# Patient Record
Sex: MALE | Race: WHITE | NOT HISPANIC OR LATINO | Employment: OTHER | URBAN - METROPOLITAN AREA
[De-identification: names, ages, dates, MRNs, and addresses within clinical notes are randomized per-mention and may not be internally consistent; named-entity substitution may affect disease eponyms.]

---

## 2018-02-05 ENCOUNTER — CONSULT (OUTPATIENT)
Dept: CARDIOLOGY | Facility: CLINIC | Age: 68
End: 2018-02-05

## 2018-02-05 VITALS
HEART RATE: 74 BPM | WEIGHT: 254.8 LBS | SYSTOLIC BLOOD PRESSURE: 126 MMHG | DIASTOLIC BLOOD PRESSURE: 86 MMHG | BODY MASS INDEX: 34.51 KG/M2 | OXYGEN SATURATION: 94 % | HEIGHT: 72 IN

## 2018-02-05 DIAGNOSIS — I10 ESSENTIAL HYPERTENSION: ICD-10-CM

## 2018-02-05 DIAGNOSIS — I34.1 MITRAL VALVE PROLAPSE: Primary | ICD-10-CM

## 2018-02-05 PROCEDURE — 93000 ELECTROCARDIOGRAM COMPLETE: CPT | Performed by: INTERNAL MEDICINE

## 2018-02-05 PROCEDURE — 99203 OFFICE O/P NEW LOW 30 MIN: CPT | Performed by: INTERNAL MEDICINE

## 2018-02-05 RX ORDER — AMLODIPINE BESYLATE 5 MG/1
5 TABLET ORAL DAILY
COMMUNITY
Start: 2018-01-30 | End: 2021-03-29

## 2018-02-05 RX ORDER — METOPROLOL SUCCINATE 50 MG/1
50 TABLET, EXTENDED RELEASE ORAL DAILY
COMMUNITY
Start: 2018-01-30

## 2018-02-05 RX ORDER — LEVOTHYROXINE SODIUM 88 UG/1
100 TABLET ORAL DAILY
COMMUNITY
Start: 2017-12-06 | End: 2022-04-04

## 2018-02-05 NOTE — PROGRESS NOTES
Subjective:     Encounter Date:02/05/2018    Primary Care Physician: Ras Blanco MD      Patient ID: Kartik Chavez is a 68 y.o. male.    Chief Complaint:Establish Care; Mitral Valve Prolapse; and Heart Murmur    PROBLEM LIST:  1. Mitral valve prolapse  a. January 2017 echocardiogram done in [place] with mild mitral valve prolapse.  Mild mitral regurgitation.  EF of 56%.  Left atrium mildly dilated.  2. Hypertension  3. Arthritis  4. Hypothyroidism  5. Surgeries:  a. Umbilical hernia repair.  b. Left knee surgery      Allergies   Allergen Reactions   • Penicillins      Cold sweat         Current Outpatient Prescriptions:   •  amLODIPine (NORVASC) 5 MG tablet, Daily., Disp: , Rfl:   •  levothyroxine (SYNTHROID, LEVOTHROID) 75 MCG tablet, Daily., Disp: , Rfl:   •  metoprolol succinate XL (TOPROL-XL) 50 MG 24 hr tablet, Daily., Disp: , Rfl:         History of Present Illness    Patient is a 68 year old  male who we are seeing today for establishment of cardiac care secondary to mitral valve prolapse.  Has no history of coronary disease.  Overall feels well.  His primary cardiologist recently left and he needs to establish care.  Denies any chest pain, pressure, tightness.  Denies any increasing shortness of breath.  No syncope, near-syncope, or edema.  States he is compliant with his medications.    The following portions of the patient's history were reviewed and updated as appropriate: allergies, current medications, past family history, past medical history, past social history, past surgical history and problem list.    Family History   Problem Relation Age of Onset   • Aneurysm Mother      heart   • Heart attack Father    • Heart attack Sister    • Rheumatic fever Brother    • Heart disease Brother    • No Known Problems Sister        Social History   Substance Use Topics   • Smoking status: Never Smoker   • Smokeless tobacco: Never Used   • Alcohol use No         Review of Systems   Constitution:  "Negative for fever, weakness and malaise/fatigue.   HENT: Negative for nosebleeds.    Eyes: Negative for redness and visual disturbance.   Cardiovascular: Negative for orthopnea, palpitations and paroxysmal nocturnal dyspnea.   Respiratory: Positive for shortness of breath, snoring and wheezing. Negative for cough and sputum production.    Hematologic/Lymphatic: Negative for bleeding problem.   Skin: Negative for flushing, itching and rash.   Musculoskeletal: Positive for arthritis. Negative for falls, joint pain and muscle cramps.   Gastrointestinal: Negative for abdominal pain, diarrhea, heartburn, nausea and vomiting.   Genitourinary: Positive for urgency. Negative for hematuria.   Neurological: Negative for excessive daytime sleepiness, dizziness, headaches and tremors.   Psychiatric/Behavioral: Negative for substance abuse. The patient is not nervous/anxious.           Objective:    height is 182.9 cm (72\") and weight is 116 kg (254 lb 12.8 oz). His blood pressure is 126/86 and his pulse is 74. His oxygen saturation is 94%.         Physical Exam   Constitutional: He is oriented to person, place, and time. He appears well-developed and well-nourished.   HENT:   Head: Normocephalic and atraumatic.   Mouth/Throat: Oropharynx is clear and moist.   Eyes: Conjunctivae are normal. Pupils are equal, round, and reactive to light.   Neck: Normal carotid pulses and no JVD present. Carotid bruit is not present. No thyromegaly present.   Cardiovascular: Normal rate, regular rhythm, S1 normal and S2 normal.  Exam reveals no gallop and no friction rub.    Murmur heard.   Crescendo-decrescendo mid to late systolic murmur is present with a grade of 3/6  at the apex  Pulses:       Carotid pulses are 2+ on the right side, and 2+ on the left side.       Dorsalis pedis pulses are 2+ on the right side, and 2+ on the left side.        Posterior tibial pulses are 2+ on the right side, and 2+ on the left side.   Pulmonary/Chest: No " respiratory distress. He has no wheezes. He has no rales. He exhibits no tenderness.   Abdominal: He exhibits no distension, no abdominal bruit and no mass. There is no hepatosplenomegaly. There is no tenderness. There is no rebound.   Musculoskeletal: He exhibits no edema, tenderness or deformity.   Lymphadenopathy:     He has no cervical adenopathy.   Neurological: He is alert and oriented to person, place, and time. He has normal strength.   Skin: Skin is warm and dry. No rash noted. No cyanosis. Nails show no clubbing.   Psychiatric: He has a normal mood and affect. Cognition and memory are normal.         ECG 12 Lead  Date/Time: 2/5/2018 4:43 PM  Performed by: BK COE  Authorized by: BK COE   Rhythm: sinus rhythm  Ectopy: PVCs                  Assessment:   Assessment/Plan      Kartik was seen today for establish care, mitral valve prolapse and heart murmur.    Diagnoses and all orders for this visit:    Mitral valve prolapse    Essential hypertension    Other orders  -     ECG 12 Lead      1.  Mitral valve prolapse, mild to moderate by examination and echocardiography.  Asymptomatic.  We'll simply follow along for now.  Discussed symptoms with which to seek medical attention for.  Otherwise we'll follow clinically with no need for surgical repair at this time.  2.  Hypertension controlled  CDL paperwork filled out.  3.  Revisit annually or when necessary symptom change       Corinne ARMSTRONG scribed portions of this dictation for  Dr. Coe.  I, Bk Coe MD, personally performed the services described in this documentation as scribed by the above individual in my presence, and it is both accurate and complete    Dictated utilizing Dragon dictation

## 2019-02-11 ENCOUNTER — OFFICE VISIT (OUTPATIENT)
Dept: CARDIOLOGY | Facility: CLINIC | Age: 69
End: 2019-02-11

## 2019-02-11 VITALS
DIASTOLIC BLOOD PRESSURE: 84 MMHG | WEIGHT: 239.6 LBS | SYSTOLIC BLOOD PRESSURE: 132 MMHG | HEART RATE: 58 BPM | OXYGEN SATURATION: 98 % | HEIGHT: 72 IN | BODY MASS INDEX: 32.45 KG/M2

## 2019-02-11 DIAGNOSIS — I34.1 MITRAL VALVE PROLAPSE: Primary | ICD-10-CM

## 2019-02-11 DIAGNOSIS — I10 ESSENTIAL HYPERTENSION: ICD-10-CM

## 2019-02-11 PROCEDURE — 99213 OFFICE O/P EST LOW 20 MIN: CPT | Performed by: INTERNAL MEDICINE

## 2019-02-11 RX ORDER — ASPIRIN 325 MG
325 TABLET ORAL DAILY
COMMUNITY

## 2019-02-11 NOTE — PROGRESS NOTES
Subjective:     Encounter Date:02/11/2019      Patient ID: Kartik Chavez is a 69 y.o. male.    Chief Complaint: Mitral Valve Prolapse and Shortness of Breath      PROBLEM LIST:  1. Mitral valve prolapse  a. January 2017 echocardiogram done in [place] with mild mitral valve prolapse.  Mild mitral regurgitation.  EF of 56%.  Left atrium mildly dilated.  2. Hypertension  3. Arthritis  4. Hypothyroidism  5. Surgeries:  a. Umbilical hernia repair.  b. Left knee surgery    History of Present Illness  Kartik Chavez returns today for a 12 month follow up with a history of mitral valve prolapse and hypertension. Since last visit he has been doing well from a cardiovascular standpoint. Describes that he has been experiencing shortness of breath Denies any exertional chest pain, shortness of breath, orthopnea, PND, or palpitations.    Allergies   Allergen Reactions   • Penicillins      Cold sweat         Current Outpatient Medications:   •  amLODIPine (NORVASC) 5 MG tablet, Take 5 mg by mouth Daily., Disp: , Rfl:   •  aspirin 325 MG tablet, Take 325 mg by mouth Daily., Disp: , Rfl:   •  levothyroxine (SYNTHROID, LEVOTHROID) 75 MCG tablet, Take 75 mcg by mouth Daily., Disp: , Rfl:   •  metoprolol succinate XL (TOPROL-XL) 50 MG 24 hr tablet, Take 50 mg by mouth Daily., Disp: , Rfl:     The following portions of the patient's history were reviewed and updated as appropriate: allergies, current medications, past family history, past medical history, past social history, past surgical history and problem list.    Review of Systems   Constitution: Positive for weight gain.   Cardiovascular: Negative.  Negative for chest pain, leg swelling, near-syncope, orthopnea, palpitations, paroxysmal nocturnal dyspnea and syncope.   Respiratory: Positive for shortness of breath. Negative for cough.    Hematologic/Lymphatic: Negative for bleeding problem. Does not bruise/bleed easily.   Skin: Negative for rash.   Musculoskeletal: Negative for  "muscle weakness and myalgias.   Gastrointestinal: Negative for heartburn, nausea and vomiting.   Neurological: Negative.  Negative for dizziness, headaches, light-headedness, loss of balance, tremors and vertigo.   Psychiatric/Behavioral: The patient is not nervous/anxious.           Objective:     Vitals:    02/11/19 1259   BP: 132/84   BP Location: Right arm   Patient Position: Sitting   Pulse: 58   SpO2: 98%   Weight: 109 kg (239 lb 9.6 oz)   Height: 182.9 cm (72\")         Physical Exam   Constitutional: He is oriented to person, place, and time. He appears well-developed and well-nourished.   HENT:   Mouth/Throat: Oropharynx is clear and moist.   Neck: No JVD present. Carotid bruit is not present. No thyromegaly present.   Cardiovascular: Regular rhythm, S1 normal, S2 normal and intact distal pulses. Exam reveals no gallop, no S3 and no S4.   Murmur heard.   Crescendo-decrescendo mid to late systolic murmur is present with a grade of 3/6 at the lower left sternal border and apex.  Pulses:       Carotid pulses are 2+ on the right side, and 2+ on the left side.       Radial pulses are 2+ on the right side, and 2+ on the left side.   Pulmonary/Chest: Breath sounds normal.   Abdominal: Soft. Bowel sounds are normal. He exhibits no mass. There is no tenderness.   Musculoskeletal: He exhibits no edema.   Neurological: He is alert and oriented to person, place, and time.   Skin: Skin is warm and dry. No rash noted.       Lab Review:    Procedures        Assessment:   Kartik was seen today for mitral valve prolapse and shortness of breath.    Diagnoses and all orders for this visit:    Mitral valve prolapse    Essential hypertension        Impression:  1. Mitral valve prolapse, moderate by exam and last echocardiogram  2. Dyspnea on exertion, possibly due to the above versus \"black lung\" which is being evaluated for    Plan:  1. Order echocardiogram.  2. Continue current medications.  3. Revisit in 12 MO, or sooner as " needed.    Scribed for Anthony Garcia MD by Joselito Olivia. 2/11/2019  1:28 PM    I, Anthony Garcia MD, personally performed the services described in this documentation as scribed by the above individual in my presence, and it is both accurate and complete      Please note that portions of this note may have been completed with a voice recognition program. Efforts were made to edit the dictations, but occasionally words are mistranscribed.

## 2019-06-17 ENCOUNTER — HOSPITAL ENCOUNTER (OUTPATIENT)
Dept: CARDIOLOGY | Facility: HOSPITAL | Age: 69
Discharge: HOME | End: 2019-06-17
Admitting: INTERNAL MEDICINE

## 2019-06-17 VITALS — WEIGHT: 239 LBS | HEIGHT: 72 IN | BODY MASS INDEX: 32.37 KG/M2

## 2019-06-17 DIAGNOSIS — I34.1 MITRAL VALVE PROLAPSE: ICD-10-CM

## 2019-06-17 LAB
BH CV ECHO MEAS - AO MAX PG (FULL): 0.92 MMHG
BH CV ECHO MEAS - AO MAX PG: 4.7 MMHG
BH CV ECHO MEAS - AO ROOT AREA (BSA CORRECTED): 1.8
BH CV ECHO MEAS - AO ROOT AREA: 14 CM^2
BH CV ECHO MEAS - AO ROOT DIAM: 4.2 CM
BH CV ECHO MEAS - AO V2 MAX: 108.6 CM/SEC
BH CV ECHO MEAS - AVA(V,A): 4.5 CM^2
BH CV ECHO MEAS - AVA(V,D): 4.5 CM^2
BH CV ECHO MEAS - BSA(HAYCOCK): 2.4 M^2
BH CV ECHO MEAS - BSA: 2.3 M^2
BH CV ECHO MEAS - BZI_BMI: 32.4 KILOGRAMS/M^2
BH CV ECHO MEAS - BZI_METRIC_HEIGHT: 182.9 CM
BH CV ECHO MEAS - BZI_METRIC_WEIGHT: 108.4 KG
BH CV ECHO MEAS - EDV(CUBED): 171.5 ML
BH CV ECHO MEAS - EDV(MOD-SP2): 110 ML
BH CV ECHO MEAS - EDV(MOD-SP4): 166 ML
BH CV ECHO MEAS - EDV(TEICH): 150.9 ML
BH CV ECHO MEAS - EF(CUBED): 69 %
BH CV ECHO MEAS - EF(MOD-SP2): 68.2 %
BH CV ECHO MEAS - EF(MOD-SP4): 65.1 %
BH CV ECHO MEAS - EF(TEICH): 59.9 %
BH CV ECHO MEAS - ESV(CUBED): 53.2 ML
BH CV ECHO MEAS - ESV(MOD-SP2): 35 ML
BH CV ECHO MEAS - ESV(MOD-SP4): 58 ML
BH CV ECHO MEAS - ESV(TEICH): 60.5 ML
BH CV ECHO MEAS - FS: 32.3 %
BH CV ECHO MEAS - IVS/LVPW: 1
BH CV ECHO MEAS - IVSD: 1 CM
BH CV ECHO MEAS - LA DIMENSION: 4.2 CM
BH CV ECHO MEAS - LA/AO: 0.98
BH CV ECHO MEAS - LAD MAJOR: 5.7 CM
BH CV ECHO MEAS - LAT PEAK E' VEL: 8.3 CM/SEC
BH CV ECHO MEAS - LATERAL E/E' RATIO: 9
BH CV ECHO MEAS - LV DIASTOLIC VOL/BSA (35-75): 72.2 ML/M^2
BH CV ECHO MEAS - LV MASS(C)D: 216 GRAMS
BH CV ECHO MEAS - LV MASS(C)DI: 94 GRAMS/M^2
BH CV ECHO MEAS - LV MAX PG: 3.8 MMHG
BH CV ECHO MEAS - LV SYSTOLIC VOL/BSA (12-30): 25.2 ML/M^2
BH CV ECHO MEAS - LV V1 MAX: 97.4 CM/SEC
BH CV ECHO MEAS - LVIDD: 5.6 CM
BH CV ECHO MEAS - LVIDS: 3.8 CM
BH CV ECHO MEAS - LVLD AP2: 7.1 CM
BH CV ECHO MEAS - LVLD AP4: 9 CM
BH CV ECHO MEAS - LVLS AP2: 6 CM
BH CV ECHO MEAS - LVLS AP4: 8.1 CM
BH CV ECHO MEAS - LVOT AREA (M): 4.9 CM^2
BH CV ECHO MEAS - LVOT AREA: 5.1 CM^2
BH CV ECHO MEAS - LVOT DIAM: 2.5 CM
BH CV ECHO MEAS - LVPWD: 1 CM
BH CV ECHO MEAS - MED PEAK E' VEL: 4.5 CM/SEC
BH CV ECHO MEAS - MEDIAL E/E' RATIO: 16.6
BH CV ECHO MEAS - MV A MAX VEL: 69.4 CM/SEC
BH CV ECHO MEAS - MV DEC TIME: 0.23 SEC
BH CV ECHO MEAS - MV E MAX VEL: 76 CM/SEC
BH CV ECHO MEAS - MV E/A: 1.1
BH CV ECHO MEAS - PA ACC SLOPE: 922.3 CM/SEC^2
BH CV ECHO MEAS - PA ACC TIME: 0.05 SEC
BH CV ECHO MEAS - PA PR(ACCEL): 56.4 MMHG
BH CV ECHO MEAS - SI(CUBED): 51.5 ML/M^2
BH CV ECHO MEAS - SI(MOD-SP2): 32.6 ML/M^2
BH CV ECHO MEAS - SI(MOD-SP4): 47 ML/M^2
BH CV ECHO MEAS - SI(TEICH): 39.3 ML/M^2
BH CV ECHO MEAS - SV(CUBED): 118.3 ML
BH CV ECHO MEAS - SV(MOD-SP2): 75 ML
BH CV ECHO MEAS - SV(MOD-SP4): 108 ML
BH CV ECHO MEAS - SV(TEICH): 90.4 ML
BH CV ECHO MEAS - TAPSE (>1.6): 2.1 CM2
BH CV ECHO MEASUREMENTS AVERAGE E/E' RATIO: 11.88
BH CV VAS BP LEFT ARM: NORMAL MMHG
BH CV XLRA - RV BASE: 4 CM
BH CV XLRA - RV LENGTH: 8.4 CM
BH CV XLRA - RV MID: 4.1 CM
BH CV XLRA - TDI S': 10.2 CM/SEC
LEFT ATRIUM VOLUME INDEX: 36.6 ML/M^2
LEFT ATRIUM VOLUME: 84 ML
LV EF 2D ECHO EST: 65 %

## 2019-06-17 PROCEDURE — 93306 TTE W/DOPPLER COMPLETE: CPT | Performed by: INTERNAL MEDICINE

## 2019-06-17 PROCEDURE — 93306 TTE W/DOPPLER COMPLETE: CPT

## 2020-03-09 ENCOUNTER — OFFICE VISIT (OUTPATIENT)
Dept: CARDIOLOGY | Facility: CLINIC | Age: 70
End: 2020-03-09

## 2020-03-09 VITALS
DIASTOLIC BLOOD PRESSURE: 82 MMHG | OXYGEN SATURATION: 93 % | HEIGHT: 72 IN | SYSTOLIC BLOOD PRESSURE: 146 MMHG | BODY MASS INDEX: 32.67 KG/M2 | HEART RATE: 65 BPM | WEIGHT: 241.2 LBS

## 2020-03-09 DIAGNOSIS — I34.1 MITRAL VALVE PROLAPSE: Primary | ICD-10-CM

## 2020-03-09 DIAGNOSIS — I10 ESSENTIAL HYPERTENSION: ICD-10-CM

## 2020-03-09 PROCEDURE — 99213 OFFICE O/P EST LOW 20 MIN: CPT | Performed by: INTERNAL MEDICINE

## 2020-03-09 RX ORDER — TELMISARTAN 20 MG/1
20 TABLET ORAL DAILY
COMMUNITY
Start: 2020-02-28

## 2020-03-09 NOTE — PROGRESS NOTES
[facility]  Subjective:     Encounter Date:03/09/2020      Patient ID: Kartik Chavez is a 70 y.o. male.    Chief Complaint: Mitral Valve Prolapse      PROBLEM LIST:  1. Mitral valve prolapse  a. Echocardiogram, January 2017, in [place]: Mild MVP, mild MR. EF 56%. Left atrium mildly dilated.  b. Echocardiogram, 06/17/2019: Moderate bileaflet MVP with myxomatous changes. Posterior mitral valve leaflet thickening. Moderate MR.  2. Hypertension  3. Arthritis  4. Hypothyroidism  5. Surgeries:  a. Umbilical hernia repair.  b. Left knee surgery    History of Present Illness  Kartik Chavez returns today for annual follow up with a history of mitral valve prolapse and hypertension. Since last visit, he has been feeling well overall from a cardiovascular standpoint. He is retired, and spends most of his time at the house. He does try to stay active with doing renovations and household chores. Patient denies chest pain, shortness of breath, edema, dizziness, and syncope.       Allergies   Allergen Reactions   • Penicillins Other (See Comments)     Cold sweat         Current Outpatient Medications:   •  amLODIPine (NORVASC) 5 MG tablet, Take 5 mg by mouth Daily., Disp: , Rfl:   •  aspirin 325 MG tablet, Take 325 mg by mouth Daily., Disp: , Rfl:   •  levothyroxine (SYNTHROID, LEVOTHROID) 88 MCG tablet, Take 88 mcg by mouth Daily., Disp: , Rfl:   •  metoprolol succinate XL (TOPROL-XL) 50 MG 24 hr tablet, Take 50 mg by mouth Daily., Disp: , Rfl:   •  telmisartan (MICARDIS) 20 MG tablet, 20 mg Daily., Disp: , Rfl:     The following portions of the patient's history were reviewed and updated as appropriate: allergies, current medications, past family history, past medical history, past social history, past surgical history and problem list.    Review of Systems   Constitution: Negative. Negative for malaise/fatigue.   Eyes: Negative for vision loss in left eye and vision loss in right eye.  "  Cardiovascular: Positive for irregular heartbeat. Negative for chest pain, dyspnea on exertion, near-syncope, orthopnea, palpitations, paroxysmal nocturnal dyspnea and syncope.   Respiratory: Negative.    Hematologic/Lymphatic: Bruises/bleeds easily.   Skin: Negative for rash.   Musculoskeletal: Positive for arthritis and back pain. Negative for muscle weakness and myalgias.   Gastrointestinal: Negative for heartburn, nausea and vomiting.   Genitourinary: Positive for frequency.   Neurological: Negative.  Negative for brief paralysis, excessive daytime sleepiness, focal weakness, numbness, paresthesias and weakness.          Objective:     Vitals:    03/09/20 1433   BP: 146/82   BP Location: Right arm   Patient Position: Sitting   Pulse: 65   SpO2: 93%   Weight: 109 kg (241 lb 3.2 oz)   Height: 182.9 cm (72\")         Physical Exam   Constitutional: He is oriented to person, place, and time. He appears well-developed and well-nourished.   HENT:   Mouth/Throat: Oropharynx is clear and moist.   Neck: No JVD present. Carotid bruit is not present. No thyromegaly present.   Cardiovascular: Regular rhythm, S1 normal, S2 normal and intact distal pulses. Exam reveals no gallop, no S3 and no S4.   Murmur heard.   Mid to late systolic murmur is present with a grade of 3/6.  Pulses:       Carotid pulses are 2+ on the right side, and 2+ on the left side.       Radial pulses are 2+ on the right side, and 2+ on the left side.   Pulmonary/Chest: Breath sounds normal.   Abdominal: Soft. Bowel sounds are normal. He exhibits no mass. There is no tenderness.   Musculoskeletal: He exhibits no edema.   Neurological: He is alert and oriented to person, place, and time.   Skin: Skin is warm and dry. No rash noted.           Assessment:   Kartik was seen today for mitral valve prolapse.    Diagnoses and all orders for this visit:    Mitral valve prolapse    Essential hypertension        Impression:  1. Mitral valve prolapse: Moderate " bileaflet MVP with myxomatous changes on June 2019 echo. Stable. Aspirin 325 mg daily.  2. Hypertension, acceptable control on amlodipine, metoprolol, and telmisartan.     Plan:  1. Schedule echocardiogram to reassess VHD. Pt instructed to monitor himself for any unusual dyspnea, fatigue, swelling, or dizziness. If he begins to experience any of these symptoms, he should call our office and schedule a f/up appointment.  2. Continue current medications.  3. Revisit in 12 MO, or sooner as needed.    Scribed for Anthony Garcia MD by Jacki Hughes. 3/9/2020  4:25 PM    Anthony Garcia MD      Please note that portions of this note may have been completed with a voice recognition program. Efforts were made to edit the dictations, but occasionally words are mistranscribed.

## 2020-07-13 ENCOUNTER — HOSPITAL ENCOUNTER (OUTPATIENT)
Dept: CARDIOLOGY | Facility: HOSPITAL | Age: 70
Discharge: HOME | End: 2020-07-13
Admitting: INTERNAL MEDICINE

## 2020-07-13 VITALS
HEIGHT: 72 IN | BODY MASS INDEX: 32.64 KG/M2 | WEIGHT: 241 LBS | SYSTOLIC BLOOD PRESSURE: 124 MMHG | DIASTOLIC BLOOD PRESSURE: 93 MMHG

## 2020-07-13 DIAGNOSIS — I34.1 MITRAL VALVE PROLAPSE: ICD-10-CM

## 2020-07-13 PROCEDURE — 93306 TTE W/DOPPLER COMPLETE: CPT

## 2020-07-13 PROCEDURE — 93306 TTE W/DOPPLER COMPLETE: CPT | Performed by: INTERNAL MEDICINE

## 2020-07-14 LAB
BH CV ECHO MEAS - AI DEC SLOPE: 322.1 CM/SEC^2
BH CV ECHO MEAS - AI MAX PG: 102.4 MMHG
BH CV ECHO MEAS - AI MAX VEL: 505.9 CM/SEC
BH CV ECHO MEAS - AI P1/2T: 460.1 MSEC
BH CV ECHO MEAS - AO MAX PG (FULL): 3.3 MMHG
BH CV ECHO MEAS - AO MAX PG: 8 MMHG
BH CV ECHO MEAS - AO MEAN PG (FULL): 1.8 MMHG
BH CV ECHO MEAS - AO MEAN PG: 3.7 MMHG
BH CV ECHO MEAS - AO ROOT AREA (BSA CORRECTED): 1.7
BH CV ECHO MEAS - AO ROOT AREA: 12.7 CM^2
BH CV ECHO MEAS - AO ROOT DIAM: 4 CM
BH CV ECHO MEAS - AO V2 MAX: 137.9 CM/SEC
BH CV ECHO MEAS - AO V2 MEAN: 87.8 CM/SEC
BH CV ECHO MEAS - AO V2 VTI: 29.5 CM
BH CV ECHO MEAS - ASC AORTA: 3.7 CM
BH CV ECHO MEAS - AVA(I,A): 2.8 CM^2
BH CV ECHO MEAS - AVA(I,D): 2.8 CM^2
BH CV ECHO MEAS - AVA(V,A): 3.1 CM^2
BH CV ECHO MEAS - AVA(V,D): 3.1 CM^2
BH CV ECHO MEAS - BSA(HAYCOCK): 2.4 M^2
BH CV ECHO MEAS - BSA: 2.3 M^2
BH CV ECHO MEAS - BZI_BMI: 32.7 KILOGRAMS/M^2
BH CV ECHO MEAS - BZI_METRIC_HEIGHT: 182.9 CM
BH CV ECHO MEAS - BZI_METRIC_WEIGHT: 109.3 KG
BH CV ECHO MEAS - EDV(CUBED): 277.9 ML
BH CV ECHO MEAS - EDV(MOD-SP2): 133 ML
BH CV ECHO MEAS - EDV(MOD-SP4): 163 ML
BH CV ECHO MEAS - EDV(TEICH): 217.9 ML
BH CV ECHO MEAS - EF(CUBED): 85.4 %
BH CV ECHO MEAS - EF(MOD-BP): 54.7 %
BH CV ECHO MEAS - EF(MOD-SP2): 62.3 %
BH CV ECHO MEAS - EF(MOD-SP4): 54.8 %
BH CV ECHO MEAS - EF(TEICH): 77.7 %
BH CV ECHO MEAS - ESV(CUBED): 40.5 ML
BH CV ECHO MEAS - ESV(MOD-SP2): 50.2 ML
BH CV ECHO MEAS - ESV(MOD-SP4): 73.7 ML
BH CV ECHO MEAS - ESV(TEICH): 48.6 ML
BH CV ECHO MEAS - FS: 47.4 %
BH CV ECHO MEAS - IVS/LVPW: 0.93
BH CV ECHO MEAS - IVSD: 0.96 CM
BH CV ECHO MEAS - LA DIMENSION: 4.9 CM
BH CV ECHO MEAS - LA/AO: 1.2
BH CV ECHO MEAS - LAD MAJOR: 6.6 CM
BH CV ECHO MEAS - LAT PEAK E' VEL: 11.7 CM/SEC
BH CV ECHO MEAS - LATERAL E/E' RATIO: 7.3
BH CV ECHO MEAS - LV DIASTOLIC VOL/BSA (35-75): 70.7 ML/M^2
BH CV ECHO MEAS - LV MASS(C)D: 283.7 GRAMS
BH CV ECHO MEAS - LV MASS(C)DI: 123 GRAMS/M^2
BH CV ECHO MEAS - LV MAX PG: 4.7 MMHG
BH CV ECHO MEAS - LV MEAN PG: 1.9 MMHG
BH CV ECHO MEAS - LV SYSTOLIC VOL/BSA (12-30): 32 ML/M^2
BH CV ECHO MEAS - LV V1 MAX: 108 CM/SEC
BH CV ECHO MEAS - LV V1 MEAN: 63.2 CM/SEC
BH CV ECHO MEAS - LV V1 VTI: 20.9 CM
BH CV ECHO MEAS - LVIDD: 6.5 CM
BH CV ECHO MEAS - LVIDS: 3.4 CM
BH CV ECHO MEAS - LVLD AP2: 8.6 CM
BH CV ECHO MEAS - LVLD AP4: 10 CM
BH CV ECHO MEAS - LVLS AP2: 6.3 CM
BH CV ECHO MEAS - LVLS AP4: 8.6 CM
BH CV ECHO MEAS - LVOT AREA (M): 3.8 CM^2
BH CV ECHO MEAS - LVOT AREA: 4 CM^2
BH CV ECHO MEAS - LVOT DIAM: 2.2 CM
BH CV ECHO MEAS - LVPWD: 1 CM
BH CV ECHO MEAS - MED PEAK E' VEL: 6.5 CM/SEC
BH CV ECHO MEAS - MEDIAL E/E' RATIO: 13.2
BH CV ECHO MEAS - MR MAX PG: 90 MMHG
BH CV ECHO MEAS - MR MAX VEL: 474.2 CM/SEC
BH CV ECHO MEAS - MR MEAN PG: 50.5 MMHG
BH CV ECHO MEAS - MR MEAN VEL: 334.5 CM/SEC
BH CV ECHO MEAS - MR VTI: 154.2 CM
BH CV ECHO MEAS - MV A MAX VEL: 81 CM/SEC
BH CV ECHO MEAS - MV DEC SLOPE: 382.9 CM/SEC^2
BH CV ECHO MEAS - MV DEC TIME: 0.2 SEC
BH CV ECHO MEAS - MV E MAX VEL: 85.3 CM/SEC
BH CV ECHO MEAS - MV E/A: 1.1
BH CV ECHO MEAS - MV MAX PG: 3.5 MMHG
BH CV ECHO MEAS - MV MEAN PG: 1.3 MMHG
BH CV ECHO MEAS - MV P1/2T MAX VEL: 88.1 CM/SEC
BH CV ECHO MEAS - MV P1/2T: 67.4 MSEC
BH CV ECHO MEAS - MV V2 MAX: 93.5 CM/SEC
BH CV ECHO MEAS - MV V2 MEAN: 52 CM/SEC
BH CV ECHO MEAS - MV V2 VTI: 24.7 CM
BH CV ECHO MEAS - MVA P1/2T LCG: 2.5 CM^2
BH CV ECHO MEAS - MVA(P1/2T): 3.3 CM^2
BH CV ECHO MEAS - MVA(VTI): 3.3 CM^2
BH CV ECHO MEAS - PA ACC TIME: 0.07 SEC
BH CV ECHO MEAS - PA MAX PG: 5.2 MMHG
BH CV ECHO MEAS - PA PR(ACCEL): 45.7 MMHG
BH CV ECHO MEAS - PA V2 MAX: 114.4 CM/SEC
BH CV ECHO MEAS - RAP SYSTOLE: 5 MMHG
BH CV ECHO MEAS - RVSP: 25 MMHG
BH CV ECHO MEAS - SI(AO): 161.7 ML/M^2
BH CV ECHO MEAS - SI(CUBED): 102.9 ML/M^2
BH CV ECHO MEAS - SI(LVOT): 35.8 ML/M^2
BH CV ECHO MEAS - SI(MOD-SP2): 35.9 ML/M^2
BH CV ECHO MEAS - SI(MOD-SP4): 38.7 ML/M^2
BH CV ECHO MEAS - SI(TEICH): 73.4 ML/M^2
BH CV ECHO MEAS - SV(AO): 372.8 ML
BH CV ECHO MEAS - SV(CUBED): 237.4 ML
BH CV ECHO MEAS - SV(LVOT): 82.5 ML
BH CV ECHO MEAS - SV(MOD-SP2): 82.8 ML
BH CV ECHO MEAS - SV(MOD-SP4): 89.3 ML
BH CV ECHO MEAS - SV(TEICH): 169.3 ML
BH CV ECHO MEAS - TAPSE (>1.6): 2.8 CM2
BH CV ECHO MEAS - TR MAX PG: 20 MMHG
BH CV ECHO MEAS - TR MAX VEL: 220.8 CM/SEC
BH CV ECHO MEASUREMENTS AVERAGE E/E' RATIO: 9.37
BH CV VAS BP RIGHT ARM: NORMAL MMHG
BH CV XLRA - RV BASE: 2.7 CM
BH CV XLRA - RV LENGTH: 7.3 CM
BH CV XLRA - RV MID: 3.4 CM
BH CV XLRA - TDI S': 16.4 CM/SEC
LEFT ATRIUM VOLUME INDEX: 38.8 ML/M^2
LEFT ATRIUM VOLUME: 89.4 ML
LV EF 2D ECHO EST: 55 %
MAXIMAL PREDICTED HEART RATE: 152 BPM
STRESS TARGET HR: 129 BPM

## 2020-07-17 ENCOUNTER — TELEPHONE (OUTPATIENT)
Dept: CARDIOLOGY | Facility: CLINIC | Age: 70
End: 2020-07-17

## 2020-07-17 NOTE — TELEPHONE ENCOUNTER
Attempted all numbers in chart with no answer and no voicemail.    ----- Message from Anthony Garcia MD sent at 7/16/2020  4:45 PM EDT -----  Valve appears to be slightly worse than before.  If asymptomatic still monitor.  If having shortness of breath or exertional problems revisit ASAP

## 2020-07-17 NOTE — TELEPHONE ENCOUNTER
Results given to wife, Gabriela, she reports patient has no complaints at this time.  Advised will monitor and contact office if anything changes.  Understanding verbalized.      ---- Message from Anthony Garcia MD sent at 7/16/2020  4:45 PM EDT -----  Valve appears to be slightly worse than before.  If asymptomatic still monitor.  If having shortness of breath or exertional problems revisit ASAP

## 2021-03-29 ENCOUNTER — OFFICE VISIT (OUTPATIENT)
Dept: CARDIOLOGY | Facility: CLINIC | Age: 71
End: 2021-03-29

## 2021-03-29 VITALS
HEIGHT: 72 IN | TEMPERATURE: 97.3 F | HEART RATE: 73 BPM | WEIGHT: 246.6 LBS | SYSTOLIC BLOOD PRESSURE: 138 MMHG | DIASTOLIC BLOOD PRESSURE: 70 MMHG | BODY MASS INDEX: 33.4 KG/M2 | OXYGEN SATURATION: 97 %

## 2021-03-29 DIAGNOSIS — I10 ESSENTIAL HYPERTENSION: ICD-10-CM

## 2021-03-29 DIAGNOSIS — I34.1 MITRAL VALVE PROLAPSE: Primary | ICD-10-CM

## 2021-03-29 PROCEDURE — 99213 OFFICE O/P EST LOW 20 MIN: CPT | Performed by: INTERNAL MEDICINE

## 2021-03-29 RX ORDER — SILDENAFIL CITRATE 20 MG/1
20 TABLET ORAL AS NEEDED
COMMUNITY
Start: 2021-01-26

## 2021-03-29 NOTE — PROGRESS NOTES
[facility]  Subjective:     Encounter Date: 03/29/2021      Patient ID: Kartik Chavez is a 71 y.o. male.    Chief Complaint: Mitral Valve Prolapse      PROBLEM LIST:  1. Mitral valve prolapse  a. Echocardiogram, January 2017, in [place]: Mild MVP, mild MR. EF 56%. Left atrium mildly dilated.  b. Echocardiogram, 06/17/2019: Moderate bileaflet MVP with myxomatous changes. Posterior mitral valve leaflet thickening. Moderate MR.  c. Echocardiogram, 7/14/2020: EF 55%. Mild AR. Moderate to severe MR due to posterior leaflet prolapse.  2. Hypertension  3. Arthritis  4. Hypothyroidism  5. Surgeries:  a. Umbilical hernia repair.  b. Left knee surgery      History of Present Illness  Kartik Chavez returns today for an annual follow up with a history of mitral valve prolapse and cardiac risk factors. Since last visit, has been doing well overall from a cardiovascular standpoint. He's experienced some shortness of breath with exertion. Recently he has been seeing a pulmonologist for a potential lobectomy. Patient otherwise denies chest pain, palpitations, edema, dizziness, and syncope. Patient has had no interim ER visits, hospitalizations, serious illnesses, or injuries.        Allergies   Allergen Reactions   • Penicillins Other (See Comments)     Cold sweat         Current Outpatient Medications:   •  aspirin 325 MG tablet, Take 325 mg by mouth Daily., Disp: , Rfl:   •  levothyroxine (SYNTHROID, LEVOTHROID) 88 MCG tablet, Take 100 mcg by mouth Daily., Disp: , Rfl:   •  metoprolol succinate XL (TOPROL-XL) 50 MG 24 hr tablet, Take 50 mg by mouth Daily., Disp: , Rfl:   •  sildenafil (REVATIO) 20 MG tablet, As Needed., Disp: , Rfl:   •  telmisartan (MICARDIS) 20 MG tablet, 20 mg Daily., Disp: , Rfl:     The following portions of the patient's history were reviewed and updated as appropriate: allergies, current medications, past family history, past medical history, past social history,  "past surgical history and problem list.    Review of Systems   Constitutional: Negative.   Cardiovascular: Positive for dyspnea on exertion. Negative for chest pain, leg swelling, palpitations and syncope.   Respiratory: Positive for shortness of breath.    Hematologic/Lymphatic: Negative for bleeding problem. Does not bruise/bleed easily.   Skin: Negative for rash.   Musculoskeletal: Positive for arthritis, back pain and muscle cramps. Negative for muscle weakness and myalgias.   Gastrointestinal: Negative for heartburn, nausea and vomiting.   Genitourinary: Positive for frequency.   Neurological: Negative for dizziness, light-headedness, loss of balance and numbness.          Objective:     Vitals:    03/29/21 1434   BP: 138/70   BP Location: Left arm   Patient Position: Sitting   Pulse: 73   Temp: 97.3 °F (36.3 °C)   SpO2: 97%   Weight: 112 kg (246 lb 9.6 oz)   Height: 182.9 cm (72\")         Constitutional:       Appearance: Well-developed.   Neck:      Thyroid: No thyromegaly.      Vascular: No carotid bruit or JVD.   Pulmonary:      Breath sounds: Normal breath sounds.   Cardiovascular:      Regular rhythm.      Murmurs: There is a crescendo-decrescendo, mid to late systolic murmur at the ULSB and apex.      No gallop. No S3 and S4 gallop.   Edema:     Peripheral edema absent.   Abdominal:      General: Bowel sounds are normal.      Palpations: Abdomen is soft. There is no abdominal mass.      Tenderness: There is no abdominal tenderness.   Skin:     General: Skin is warm and dry.      Findings: No rash.   Neurological:      Mental Status: Alert and oriented to person, place, and time.         Lab Review:            Procedures        Assessment:   Diagnoses and all orders for this visit:    1. Mitral valve prolapse (Primary)    2. Essential hypertension        Impression:  1. Mitral vlave prolapse. Stable on aspirin.  Currently asymptomatic despite moderate severe regurgitation by last echo.  2. Hypertension. " Well controlled on telmisartan and metoprolol.   3. Probable coal workers pneumoconiosis    Plan:  1. Stable cardiac status.  2. Echocardiogram ordered with next visit.  3. Patient understands to call if develops symptoms of dyspnea on exertion or volume overload  4. Continue current medications.  5. Revisit in 12 MO, or sooner as needed.    Scribed for Anthony Garcia MD by Miguelina Malloy. 3/29/2021 15:08 EDT        Anthony Garcia MD      Please note that portions of this note may have been completed with a voice recognition program. Efforts were made to edit the dictations, but occasionally words are mistranscribed.

## 2021-12-02 DIAGNOSIS — I34.1 MITRAL VALVE PROLAPSE: Primary | ICD-10-CM

## 2021-12-02 DIAGNOSIS — I10 ESSENTIAL HYPERTENSION: ICD-10-CM

## 2022-04-04 ENCOUNTER — OFFICE VISIT (OUTPATIENT)
Dept: CARDIOLOGY | Facility: CLINIC | Age: 72
End: 2022-04-04

## 2022-04-04 ENCOUNTER — TRANSCRIBE ORDERS (OUTPATIENT)
Dept: CARDIOLOGY | Facility: CLINIC | Age: 72
End: 2022-04-04

## 2022-04-04 ENCOUNTER — HOSPITAL ENCOUNTER (OUTPATIENT)
Dept: CARDIOLOGY | Facility: HOSPITAL | Age: 72
Discharge: HOME | End: 2022-04-04
Admitting: INTERNAL MEDICINE

## 2022-04-04 VITALS — HEIGHT: 72 IN | BODY MASS INDEX: 33.32 KG/M2 | WEIGHT: 246 LBS

## 2022-04-04 VITALS
BODY MASS INDEX: 33.46 KG/M2 | SYSTOLIC BLOOD PRESSURE: 142 MMHG | WEIGHT: 247 LBS | OXYGEN SATURATION: 97 % | DIASTOLIC BLOOD PRESSURE: 72 MMHG | HEART RATE: 60 BPM | HEIGHT: 72 IN

## 2022-04-04 DIAGNOSIS — I10 ESSENTIAL HYPERTENSION: ICD-10-CM

## 2022-04-04 DIAGNOSIS — I34.1 MITRAL VALVE PROLAPSE: ICD-10-CM

## 2022-04-04 DIAGNOSIS — I34.1 MITRAL VALVE PROLAPSE: Primary | ICD-10-CM

## 2022-04-04 LAB
BH CV ECHO MEAS - AO ROOT AREA (BSA CORRECTED): 1.9
BH CV ECHO MEAS - AO ROOT AREA: 15.9 CM^2
BH CV ECHO MEAS - AO ROOT DIAM: 4.5 CM
BH CV ECHO MEAS - ASC AORTA: 3.8 CM
BH CV ECHO MEAS - BSA(HAYCOCK): 2.4 M^2
BH CV ECHO MEAS - BSA: 2.3 M^2
BH CV ECHO MEAS - BZI_BMI: 33.7 KILOGRAMS/M^2
BH CV ECHO MEAS - BZI_METRIC_HEIGHT: 182 CM
BH CV ECHO MEAS - BZI_METRIC_WEIGHT: 111.6 KG
BH CV ECHO MEAS - EDV(CUBED): 148.9 ML
BH CV ECHO MEAS - EDV(MOD-SP2): 235 ML
BH CV ECHO MEAS - EDV(MOD-SP4): 257 ML
BH CV ECHO MEAS - EDV(TEICH): 135.3 ML
BH CV ECHO MEAS - EF(CUBED): 73.6 %
BH CV ECHO MEAS - EF(MOD-BP): 58.2 %
BH CV ECHO MEAS - EF(MOD-SP2): 60.8 %
BH CV ECHO MEAS - EF(MOD-SP4): 58 %
BH CV ECHO MEAS - EF(TEICH): 65 %
BH CV ECHO MEAS - ESV(CUBED): 39.3 ML
BH CV ECHO MEAS - ESV(MOD-SP2): 92.1 ML
BH CV ECHO MEAS - ESV(MOD-SP4): 108 ML
BH CV ECHO MEAS - ESV(TEICH): 47.4 ML
BH CV ECHO MEAS - FS: 35.8 %
BH CV ECHO MEAS - IVS/LVPW: 0.91
BH CV ECHO MEAS - IVSD: 1 CM
BH CV ECHO MEAS - LA DIMENSION: 4.6 CM
BH CV ECHO MEAS - LA/AO: 1
BH CV ECHO MEAS - LAD MAJOR: 5.5 CM
BH CV ECHO MEAS - LAT PEAK E' VEL: 7.6 CM/SEC
BH CV ECHO MEAS - LATERAL E/E' RATIO: 9
BH CV ECHO MEAS - LV DIASTOLIC VOL/BSA (35-75): 110.9 ML/M^2
BH CV ECHO MEAS - LV MASS(C)D: 213.9 GRAMS
BH CV ECHO MEAS - LV MASS(C)DI: 92.3 GRAMS/M^2
BH CV ECHO MEAS - LV MAX PG: 2.5 MMHG
BH CV ECHO MEAS - LV MEAN PG: 1 MMHG
BH CV ECHO MEAS - LV SYSTOLIC VOL/BSA (12-30): 46.6 ML/M^2
BH CV ECHO MEAS - LV V1 MAX: 79 CM/SEC
BH CV ECHO MEAS - LV V1 MEAN: 47.7 CM/SEC
BH CV ECHO MEAS - LV V1 VTI: 19.9 CM
BH CV ECHO MEAS - LVIDD: 5.3 CM
BH CV ECHO MEAS - LVIDS: 3.4 CM
BH CV ECHO MEAS - LVLD AP2: 10.9 CM
BH CV ECHO MEAS - LVLD AP4: 10.4 CM
BH CV ECHO MEAS - LVLS AP2: 8.7 CM
BH CV ECHO MEAS - LVLS AP4: 9.6 CM
BH CV ECHO MEAS - LVOT AREA (M): 4.2 CM^2
BH CV ECHO MEAS - LVOT AREA: 4.2 CM^2
BH CV ECHO MEAS - LVOT DIAM: 2.3 CM
BH CV ECHO MEAS - LVPWD: 1.1 CM
BH CV ECHO MEAS - MED PEAK E' VEL: 5.4 CM/SEC
BH CV ECHO MEAS - MEDIAL E/E' RATIO: 12.6
BH CV ECHO MEAS - MV A MAX VEL: 78 CM/SEC
BH CV ECHO MEAS - MV DEC SLOPE: 378 CM/SEC^2
BH CV ECHO MEAS - MV DEC TIME: 0.23 SEC
BH CV ECHO MEAS - MV E MAX VEL: 68.6 CM/SEC
BH CV ECHO MEAS - MV E/A: 0.88
BH CV ECHO MEAS - MV MAX PG: 4 MMHG
BH CV ECHO MEAS - MV MEAN PG: 2 MMHG
BH CV ECHO MEAS - MV P1/2T MAX VEL: 100 CM/SEC
BH CV ECHO MEAS - MV P1/2T: 77.5 MSEC
BH CV ECHO MEAS - MV V2 MAX: 100 CM/SEC
BH CV ECHO MEAS - MV V2 MEAN: 57.4 CM/SEC
BH CV ECHO MEAS - MV V2 VTI: 32.7 CM
BH CV ECHO MEAS - MVA P1/2T LCG: 2.2 CM^2
BH CV ECHO MEAS - MVA(P1/2T): 2.8 CM^2
BH CV ECHO MEAS - MVA(VTI): 2.5 CM^2
BH CV ECHO MEAS - PA ACC TIME: 0.14 SEC
BH CV ECHO MEAS - PA PR(ACCEL): 14.2 MMHG
BH CV ECHO MEAS - SI(CUBED): 47.3 ML/M^2
BH CV ECHO MEAS - SI(LVOT): 35.7 ML/M^2
BH CV ECHO MEAS - SI(MOD-SP2): 61.6 ML/M^2
BH CV ECHO MEAS - SI(MOD-SP4): 64.3 ML/M^2
BH CV ECHO MEAS - SI(TEICH): 37.9 ML/M^2
BH CV ECHO MEAS - SV(CUBED): 109.6 ML
BH CV ECHO MEAS - SV(LVOT): 82.7 ML
BH CV ECHO MEAS - SV(MOD-SP2): 142.9 ML
BH CV ECHO MEAS - SV(MOD-SP4): 149 ML
BH CV ECHO MEAS - SV(TEICH): 87.9 ML
BH CV ECHO MEAS - TAPSE (>1.6): 2.2 CM
BH CV ECHO MEASUREMENTS AVERAGE E/E' RATIO: 10.55
BH CV VAS BP LEFT ARM: NORMAL MMHG
BH CV XLRA - RV BASE: 4.7 CM
BH CV XLRA - RV LENGTH: 7.3 CM
BH CV XLRA - RV MID: 4.3 CM
BH CV XLRA - TDI S': 14.1 CM/SEC
LEFT ATRIUM VOLUME INDEX: 26.8 ML/M^2
LEFT ATRIUM VOLUME: 62.2 ML
LV EF 2D ECHO EST: 55 %
MAXIMAL PREDICTED HEART RATE: 150 BPM
STRESS TARGET HR: 128 BPM

## 2022-04-04 PROCEDURE — 93000 ELECTROCARDIOGRAM COMPLETE: CPT | Performed by: INTERNAL MEDICINE

## 2022-04-04 PROCEDURE — 99213 OFFICE O/P EST LOW 20 MIN: CPT | Performed by: INTERNAL MEDICINE

## 2022-04-04 PROCEDURE — 93306 TTE W/DOPPLER COMPLETE: CPT | Performed by: INTERNAL MEDICINE

## 2022-04-04 PROCEDURE — 93306 TTE W/DOPPLER COMPLETE: CPT

## 2022-04-04 RX ORDER — LEVOTHYROXINE SODIUM 0.1 MG/1
1 TABLET ORAL DAILY
COMMUNITY
Start: 2022-03-11

## 2022-04-04 NOTE — PROGRESS NOTES
[facility]  Subjective:     Encounter Date: 04/04/2022      Patient ID: Kartik Chavez is a 72 y.o. male.    Chief Complaint: Mitral Valve Prolapse, Shortness of Breath, and Chest Pain      PROBLEM LIST:  1. Mitral valve prolapse  a. Echocardiogram, January 2017, in [place]: Mild MVP, mild MR. EF 56%. Left atrium mildly dilated.  b. Echocardiogram, 06/17/2019: Moderate bileaflet MVP with myxomatous changes. Posterior mitral valve leaflet thickening. Moderate MR.  c. Echocardiogram, 7/14/2020: EF 55%. Mild AR. Moderate to severe MR due to posterior leaflet prolapse.  d. Echocardiogram, 4/4/2022: EF 55%. Mitral valve prolapse of posterior mitral leaflet. Moderate MR with eccentric jet. Mild aortic valve regurgitation.  2. Hypertension  3. Arthritis  4. Hypothyroidism  5. Surgeries:  a. Umbilical hernia repair.  b. Left knee surgery    History of Present Illness  Kartik Chavez returns today for 12-month follow up with a history of mitral valve prolapse and cardiac risk factors. Since last visit, he has been feeling well overall from a cardiovascular standpoint. Patient denies chest pain, shortness of breath, palpitations, edema, dizziness, and syncope. He walks regularly and works 2-4 days per week. He does not experience cardiopulmonary limitation with activity.    Allergies   Allergen Reactions   • Penicillins Other (See Comments)     Cold sweat         Current Outpatient Medications:   •  Ascorbic Acid (VITAMIN C PO), Take 1 tablet by mouth Daily., Disp: , Rfl:   •  aspirin 325 MG tablet, Take 325 mg by mouth Daily., Disp: , Rfl:   •  Cholecalciferol (Vitamin D3) 25 MCG (1000 UT) capsule, Take 1,000 Units by mouth Daily., Disp: , Rfl:   •  levothyroxine (SYNTHROID, LEVOTHROID) 100 MCG tablet, Take 1 tablet by mouth Daily., Disp: , Rfl:   •  metoprolol succinate XL (TOPROL-XL) 50 MG 24 hr tablet, Take 50 mg by mouth Daily., Disp: , Rfl:   •  sildenafil (REVATIO) 20 MG tablet,  "Take 20 mg by mouth As Needed., Disp: , Rfl:   •  telmisartan (MICARDIS) 20 MG tablet, Take 20 mg by mouth Daily., Disp: , Rfl:     The following portions of the patient's history were reviewed and updated as appropriate: allergies, current medications, past family history, past medical history, past social history, past surgical history and problem list.    Review of Systems   Constitutional: Negative.   Cardiovascular: Negative for chest pain, dyspnea on exertion, leg swelling, palpitations and syncope.   Respiratory: Negative.  Negative for shortness of breath.    Hematologic/Lymphatic: Negative for bleeding problem. Does not bruise/bleed easily.   Skin: Negative for rash.   Musculoskeletal: Negative for muscle weakness and myalgias.   Gastrointestinal: Negative for heartburn, nausea and vomiting.   Neurological: Negative for dizziness, light-headedness, loss of balance and numbness.          Objective:     Vitals:    04/04/22 1408   BP: 142/72   BP Location: Left arm   Patient Position: Sitting   Pulse: 60   SpO2: 97%   Weight: 112 kg (247 lb)   Height: 182.9 cm (72\")         Constitutional:       Appearance: Well-developed.   Neck:      Thyroid: No thyromegaly.      Vascular: No carotid bruit or JVD.   Pulmonary:      Breath sounds: Normal breath sounds.   Cardiovascular:      Regular rhythm.      Murmurs: There is a systolic murmur.      No gallop. No S3 and S4 gallop.   Edema:     Peripheral edema absent.   Abdominal:      General: Bowel sounds are normal.      Palpations: Abdomen is soft. There is no abdominal mass.      Tenderness: There is no abdominal tenderness.   Skin:     General: Skin is warm and dry.      Findings: No rash.   Neurological:      Mental Status: Alert and oriented to person, place, and time.         Lab Review:    No recent laboratory studies available for review today.      ECG 12 Lead    Date/Time: 4/4/2022 3:01 PM  Performed by: Anthony Garcia MD  Authorized by: Anthony Garcia MD "   Comparison: compared with previous ECG from 2/5/2018  Similar to previous ECG  Rhythm: sinus bradycardia  BPM: 59  QRS axis: left  Other findings: left ventricular hypertrophy    Clinical impression: abnormal EKG                Assessment:   Diagnoses and all orders for this visit:    1. Mitral valve prolapse (Primary)    2. Essential hypertension        Impression:  1. Mitral valve prolapse. Acceptable echocardiogram prior to visit today.  Asymptomatic  2. Essential hypertension. Well controlled on telmisartan.     Plan:  1. Stable cardiac status.  2. Continue current medications.  3. Revisit in 12 MO, or sooner as needed.    Scribed for Anthony Garcia MD by Shaan Cruz. 4/4/2022  15:00 EDT       Anthony Garcia MD      Please note that portions of this note may have been completed with a voice recognition program. Efforts were made to edit the dictations, but occasionally words are mistranscribed.

## 2023-04-10 ENCOUNTER — OFFICE VISIT (OUTPATIENT)
Dept: CARDIOLOGY | Facility: CLINIC | Age: 73
End: 2023-04-10
Payer: MEDICARE

## 2023-04-10 VITALS
WEIGHT: 247 LBS | DIASTOLIC BLOOD PRESSURE: 74 MMHG | SYSTOLIC BLOOD PRESSURE: 140 MMHG | HEIGHT: 72 IN | BODY MASS INDEX: 33.46 KG/M2 | HEART RATE: 68 BPM | OXYGEN SATURATION: 94 %

## 2023-04-10 DIAGNOSIS — I34.1 MITRAL VALVE PROLAPSE: Primary | ICD-10-CM

## 2023-04-10 DIAGNOSIS — I10 ESSENTIAL HYPERTENSION: ICD-10-CM

## 2023-04-10 PROCEDURE — 99213 OFFICE O/P EST LOW 20 MIN: CPT | Performed by: INTERNAL MEDICINE

## 2023-04-10 PROCEDURE — 1159F MED LIST DOCD IN RCRD: CPT | Performed by: INTERNAL MEDICINE

## 2023-04-10 PROCEDURE — 3078F DIAST BP <80 MM HG: CPT | Performed by: INTERNAL MEDICINE

## 2023-04-10 PROCEDURE — 1160F RVW MEDS BY RX/DR IN RCRD: CPT | Performed by: INTERNAL MEDICINE

## 2023-04-10 PROCEDURE — 3077F SYST BP >= 140 MM HG: CPT | Performed by: INTERNAL MEDICINE

## 2023-04-10 NOTE — PROGRESS NOTES
[facility]  Subjective:     Encounter Date: 04/10/2023      Patient ID: Kartik Chavez is a 73 y.o. male.    Chief Complaint: Mitral Valve Prolapse      PROBLEM LIST:  1. Mitral valve prolapse  a. Echo, January 2017, in [place]: Mild MVP, mild MR. EF 56%. Left atrium mildly dilated.  b. Echo, 06/17/2019: Moderate bileaflet MVP with myxomatous changes. Posterior mitral valve leaflet thickening. Moderate MR.  c. Echo, 07/14/2020: EF 55%. Mild AR. Moderate to severe MR due to posterior leaflet prolapse.  d. Echo, 04/04/2022: EF 55%. Mitral valve prolapse of posterior mitral leaflet. Moderate MR with eccentric jet. Mild aortic valve regurgitation.  2. Hypertension  3. Arthritis  4. Hypothyroidism  5. Surgeries:  a. Umbilical hernia repair.  b. Left knee surgery      History of Present Illness  Kartik Chavez returns today for a follow up with a history of mitral valve prolapse and cardiac risk factors. Since last visit, patient has been doing well overall from a cardiovascular standpoint. He states that he remains busy and active by working around the house. Patient denies chest pain, shortness of breath, orthopnea, palpitations, edema, dizziness, and syncope.      Allergies   Allergen Reactions   • Penicillins Other (See Comments)     Cold sweat         Current Outpatient Medications:   •  Ascorbic Acid (VITAMIN C PO), Take 1 tablet by mouth Daily., Disp: , Rfl:   •  aspirin 325 MG tablet, Take 325 mg by mouth Daily., Disp: , Rfl:   •  Cholecalciferol (Vitamin D3) 25 MCG (1000 UT) capsule, Take 1,000 Units by mouth Daily., Disp: , Rfl:   •  levothyroxine (SYNTHROID, LEVOTHROID) 100 MCG tablet, Take 1 tablet by mouth Daily., Disp: , Rfl:   •  metoprolol succinate XL (TOPROL-XL) 50 MG 24 hr tablet, Take 50 mg by mouth Daily., Disp: , Rfl:   •  sildenafil (REVATIO) 20 MG tablet, Take 20 mg by mouth As Needed., Disp: , Rfl:   •  telmisartan (MICARDIS) 20 MG tablet, Take 20 mg by mouth  "Daily., Disp: , Rfl:     The following portions of the patient's history were reviewed and updated as appropriate: allergies, current medications, past family history, past medical history, past social history, past surgical history and problem list.    ROS       Objective:     Vitals:    04/10/23 1453   BP: 140/74   BP Location: Left arm   Patient Position: Sitting   Pulse: 68   SpO2: 94%   Weight: 112 kg (247 lb)   Height: 182.9 cm (72\")         Constitutional:       Appearance: Well-developed.   Neck:      Vascular: No carotid bruit.   Pulmonary:      Breath sounds: Normal breath sounds.   Cardiovascular:      Regular rhythm.      Murmurs: There is a grade 3/6 high frequency blowing holosystolic murmur at the LLSB, ULSB and apex.      No gallop. No S3 and S4 gallop.   Abdominal:      General: Bowel sounds are normal.      Palpations: Abdomen is soft. There is no abdominal mass.      Tenderness: There is no abdominal tenderness.   Skin:     General: Skin is warm and dry.      Findings: No rash.   Neurological:      Mental Status: Alert and oriented to person, place, and time.         Lab Review:    No recent laboratory studies available for review today.        Procedures        Assessment:   Diagnoses and all orders for this visit:    1. Mitral valve prolapse (Primary)    2. Essential hypertension        Impression:  1. Mitral valve prolapse. Stable. Continue on aspirin for antiplatelet therapy.   2. Essential hypertension. Well controlled. Continue on metoprolol and telmisartan.     Plan:  1. Stable cardiac status.   2. Check echocardiogram next year  3. Continue current medications.  4. Revisit in 12 MO, or sooner as needed.    Scribed for Anthony Garcia MD by Nakita Jay. 4/10/2023 15:47 EDT    Anthony Garcia MD      Please note that portions of this note may have been completed with a voice recognition program. Efforts were made to edit the dictations, but occasionally words are mistranscribed.    "

## 2023-12-04 DIAGNOSIS — I34.1 MITRAL VALVE PROLAPSE: Primary | ICD-10-CM

## 2024-04-15 ENCOUNTER — HOSPITAL ENCOUNTER (OUTPATIENT)
Dept: CARDIOLOGY | Facility: HOSPITAL | Age: 74
Discharge: HOME | End: 2024-04-15
Admitting: INTERNAL MEDICINE
Payer: MEDICARE

## 2024-04-15 ENCOUNTER — OFFICE VISIT (OUTPATIENT)
Dept: CARDIOLOGY | Facility: CLINIC | Age: 74
End: 2024-04-15
Payer: MEDICARE

## 2024-04-15 VITALS
DIASTOLIC BLOOD PRESSURE: 70 MMHG | BODY MASS INDEX: 33.18 KG/M2 | OXYGEN SATURATION: 94 % | HEIGHT: 72 IN | SYSTOLIC BLOOD PRESSURE: 142 MMHG | HEART RATE: 73 BPM | WEIGHT: 245 LBS

## 2024-04-15 VITALS — BODY MASS INDEX: 33.46 KG/M2 | HEIGHT: 72 IN | WEIGHT: 247 LBS

## 2024-04-15 DIAGNOSIS — I10 ESSENTIAL HYPERTENSION: Primary | ICD-10-CM

## 2024-04-15 DIAGNOSIS — I34.1 MITRAL VALVE PROLAPSE: ICD-10-CM

## 2024-04-15 LAB
ASCENDING AORTA: 3.5 CM
BH CV ECHO MEAS - AO MAX PG: 5.6 MMHG
BH CV ECHO MEAS - AO MEAN PG: 3 MMHG
BH CV ECHO MEAS - AO ROOT DIAM: 3.4 CM
BH CV ECHO MEAS - AO V2 MAX: 118 CM/SEC
BH CV ECHO MEAS - AO V2 VTI: 25.2 CM
BH CV ECHO MEAS - AVA(I,D): 2.5 CM2
BH CV ECHO MEAS - EDV(CUBED): 148.9 ML
BH CV ECHO MEAS - EDV(MOD-SP2): 215 ML
BH CV ECHO MEAS - EDV(MOD-SP4): 274 ML
BH CV ECHO MEAS - EF(MOD-BP): 52 %
BH CV ECHO MEAS - EF(MOD-SP2): 53 %
BH CV ECHO MEAS - EF(MOD-SP4): 49.3 %
BH CV ECHO MEAS - ESV(CUBED): 43.8 ML
BH CV ECHO MEAS - ESV(MOD-SP2): 101 ML
BH CV ECHO MEAS - ESV(MOD-SP4): 139 ML
BH CV ECHO MEAS - FS: 33.5 %
BH CV ECHO MEAS - IVS/LVPW: 1.25 CM
BH CV ECHO MEAS - IVSD: 1 CM
BH CV ECHO MEAS - LA DIMENSION: 4.1 CM
BH CV ECHO MEAS - LAT PEAK E' VEL: 6.2 CM/SEC
BH CV ECHO MEAS - LV MASS(C)D: 174.5 GRAMS
BH CV ECHO MEAS - LV MAX PG: 1.76 MMHG
BH CV ECHO MEAS - LV MEAN PG: 1 MMHG
BH CV ECHO MEAS - LV V1 MAX: 66.4 CM/SEC
BH CV ECHO MEAS - LV V1 VTI: 14.7 CM
BH CV ECHO MEAS - LVIDD: 5.3 CM
BH CV ECHO MEAS - LVIDS: 3.5 CM
BH CV ECHO MEAS - LVOT AREA: 4.3 CM2
BH CV ECHO MEAS - LVOT DIAM: 2.34 CM
BH CV ECHO MEAS - LVPWD: 0.8 CM
BH CV ECHO MEAS - MED PEAK E' VEL: 6.6 CM/SEC
BH CV ECHO MEAS - MV A MAX VEL: 68.1 CM/SEC
BH CV ECHO MEAS - MV DEC SLOPE: 296.4 CM/SEC2
BH CV ECHO MEAS - MV DEC TIME: 0.22 SEC
BH CV ECHO MEAS - MV E MAX VEL: 70.7 CM/SEC
BH CV ECHO MEAS - MV E/A: 1.04
BH CV ECHO MEAS - MV MAX PG: 3.5 MMHG
BH CV ECHO MEAS - MV MEAN PG: 1.96 MMHG
BH CV ECHO MEAS - MV P1/2T: 85.1 MSEC
BH CV ECHO MEAS - MV V2 VTI: 29.3 CM
BH CV ECHO MEAS - MVA(P1/2T): 2.6 CM2
BH CV ECHO MEAS - MVA(VTI): 2.16 CM2
BH CV ECHO MEAS - PA ACC TIME: 0.16 SEC
BH CV ECHO MEAS - SV(LVOT): 63.2 ML
BH CV ECHO MEAS - SV(MOD-SP2): 114 ML
BH CV ECHO MEAS - SV(MOD-SP4): 135 ML
BH CV ECHO MEASUREMENTS AVERAGE E/E' RATIO: 11.05
BH CV VAS BP LEFT ARM: NORMAL MMHG
BH CV XLRA - TDI S': 10.1 CM/SEC
LEFT ATRIUM VOLUME INDEX: 28.1 ML/M2
LV EF 2D ECHO EST: 55 %

## 2024-04-15 PROCEDURE — 3078F DIAST BP <80 MM HG: CPT | Performed by: NURSE PRACTITIONER

## 2024-04-15 PROCEDURE — 93306 TTE W/DOPPLER COMPLETE: CPT | Performed by: INTERNAL MEDICINE

## 2024-04-15 PROCEDURE — 1160F RVW MEDS BY RX/DR IN RCRD: CPT | Performed by: NURSE PRACTITIONER

## 2024-04-15 PROCEDURE — 99214 OFFICE O/P EST MOD 30 MIN: CPT | Performed by: NURSE PRACTITIONER

## 2024-04-15 PROCEDURE — 1159F MED LIST DOCD IN RCRD: CPT | Performed by: NURSE PRACTITIONER

## 2024-04-15 PROCEDURE — 3077F SYST BP >= 140 MM HG: CPT | Performed by: NURSE PRACTITIONER

## 2024-04-15 PROCEDURE — 93306 TTE W/DOPPLER COMPLETE: CPT

## 2024-04-15 RX ORDER — HYDROCHLOROTHIAZIDE 12.5 MG/1
12.5 CAPSULE, GELATIN COATED ORAL DAILY
Qty: 30 CAPSULE | Refills: 11 | Status: SHIPPED | OUTPATIENT
Start: 2024-04-15

## 2024-04-15 NOTE — PROGRESS NOTES
Subjective:     Encounter Date:04/15/2024    Primary Care Physician: Ravi Shah MD      Patient ID: Kartik Chavez is a 74 y.o. male.    Chief Complaint:Mitral Valve Prolapse    PROBLEM LIST:  Mitral valve prolapse  Echo, January 2017, in [place]: Mild MVP, mild MR. EF 56%. Left atrium mildly dilated.  Echo, 06/17/2019: Moderate bileaflet MVP with myxomatous changes. Posterior mitral valve leaflet thickening. Moderate MR.  Echo, 07/14/2020: EF 55%. Mild AR. Moderate to severe MR due to posterior leaflet prolapse.  Echo, 04/04/2022: EF 55%. Mitral valve prolapse of posterior mitral leaflet. Moderate MR with eccentric jet. Mild aortic valve regurgitation.  Hypertension  Arthritis  Hypothyroidism  Surgeries:  Umbilical hernia repair.  Left knee surgery      Allergies   Allergen Reactions    Penicillins Other (See Comments)     Cold sweat         Current Outpatient Medications:     Ascorbic Acid (VITAMIN C PO), Take 1 tablet by mouth Daily., Disp: , Rfl:     aspirin 325 MG tablet, Take 1 tablet by mouth Daily., Disp: , Rfl:     Cholecalciferol (Vitamin D3) 25 MCG (1000 UT) capsule, Take 1 capsule by mouth Daily., Disp: , Rfl:     levothyroxine (SYNTHROID, LEVOTHROID) 100 MCG tablet, Take 1 tablet by mouth Daily., Disp: , Rfl:     metoprolol succinate XL (TOPROL-XL) 50 MG 24 hr tablet, Take 1 tablet by mouth Daily., Disp: , Rfl:     sildenafil (REVATIO) 20 MG tablet, Take 1 tablet by mouth As Needed., Disp: , Rfl:     telmisartan (MICARDIS) 20 MG tablet, Take 3 tablets by mouth Daily., Disp: , Rfl:         History of Present Illness    Patient is a 74-year-old  male who presents today for follow-up of mitral valve prolapse and hypertension.  Since last being seen patient notes overall doing well.  He denies any chest pain, pressure, tightness.  Denies any increasing shortness of breath.  No reported syncope, presyncope, or edema.  Notes that his blood pressure has been elevated lately and his Micardis dose  "has been uptitrated by his primary care physician.    The following portions of the patient's history were reviewed and updated as appropriate: allergies, current medications, past family history, past medical history, past social history, past surgical history and problem list.      Social History     Tobacco Use    Smoking status: Never    Smokeless tobacco: Never   Vaping Use    Vaping status: Never Used   Substance Use Topics    Alcohol use: No    Drug use: No         ROS       Objective:   /70 (BP Location: Left arm, Patient Position: Sitting)   Pulse 73   Ht 182.9 cm (72\")   Wt 111 kg (245 lb)   SpO2 94%   BMI 33.23 kg/m²         Vitals reviewed.   Constitutional:       Appearance: Well-developed and not in distress.   Neck:      Vascular: No JVD.      Trachea: No tracheal deviation.   Pulmonary:      Effort: Pulmonary effort is normal.      Breath sounds: Normal breath sounds.   Cardiovascular:      Normal rate. Regular rhythm.      Murmurs: There is a grade 2/6 systolic murmur, radiating to the apex.   Pulses:     Intact distal pulses.   Edema:     Peripheral edema absent.   Musculoskeletal:         General: No deformity. Skin:     General: Skin is warm and dry.   Neurological:      Mental Status: Alert and oriented to person, place, and time.         Procedures  Echo:    Left ventricular systolic function is normal. Estimated left ventricular EF = 55%    Moderate mitral valve regurgitation is present.        Assessment:   Assessment & Plan      Diagnoses and all orders for this visit:    1. Essential hypertension (Primary), elevated.  -     Basic Metabolic Panel; Future    2. Mitral valve prolapse, stable by echocardiogram performed today.  Reviewed with patient in the office today.    Other orders  -     hydroCHLOROthiazide (MICROZIDE) 12.5 MG capsule; Take 1 capsule by mouth Daily.  Dispense: 30 capsule; Refill: 11      Plan:  Add HCTZ 12.5 mg daily to his medication regimen.  Check BMP in 2 " weeks.  Follow-up with primary care in 1 month for blood pressure check.  Follow-up with cardiology in 1 years time or sooner if needed.       Corinne ARMSTRONG     Advance Care Planning   ACP discussion was held with the patient during this visit. Patient does not have an advance directive, declines further assistance.        Dictated utilizing Dragon dictation

## 2024-04-15 NOTE — LETTER
April 15, 2024       No Recipients    Patient: Kartik Chavez   YOB: 1950   Date of Visit: 4/15/2024     Dear Ravi Shah MD:       Thank you for referring Kartik Chavez to me for evaluation. Below are the relevant portions of my assessment and plan of care.    If you have questions, please do not hesitate to call me. I look forward to following Kartik along with you.         Sincerely,        PATTI Ramos        CC:   No Recipients    Corinne Izaguirre APRN  04/15/24 1700  Sign when Signing Visit  Subjective:     Encounter Date:04/15/2024    Primary Care Physician: Ravi Shah MD      Patient ID: Kartik Chavez is a 74 y.o. male.    Chief Complaint:Mitral Valve Prolapse    PROBLEM LIST:  Mitral valve prolapse  Echo, January 2017, in [place]: Mild MVP, mild MR. EF 56%. Left atrium mildly dilated.  Echo, 06/17/2019: Moderate bileaflet MVP with myxomatous changes. Posterior mitral valve leaflet thickening. Moderate MR.  Echo, 07/14/2020: EF 55%. Mild AR. Moderate to severe MR due to posterior leaflet prolapse.  Echo, 04/04/2022: EF 55%. Mitral valve prolapse of posterior mitral leaflet. Moderate MR with eccentric jet. Mild aortic valve regurgitation.  Hypertension  Arthritis  Hypothyroidism  Surgeries:  Umbilical hernia repair.  Left knee surgery      Allergies   Allergen Reactions   • Penicillins Other (See Comments)     Cold sweat         Current Outpatient Medications:   •  Ascorbic Acid (VITAMIN C PO), Take 1 tablet by mouth Daily., Disp: , Rfl:   •  aspirin 325 MG tablet, Take 1 tablet by mouth Daily., Disp: , Rfl:   •  Cholecalciferol (Vitamin D3) 25 MCG (1000 UT) capsule, Take 1 capsule by mouth Daily., Disp: , Rfl:   •  levothyroxine (SYNTHROID, LEVOTHROID) 100 MCG tablet, Take 1 tablet by mouth Daily., Disp: , Rfl:   •  metoprolol succinate XL (TOPROL-XL) 50 MG 24 hr tablet, Take 1 tablet by mouth Daily., Disp: , Rfl:   •  sildenafil (REVATIO) 20 MG tablet, Take 1 tablet by mouth As  "Needed., Disp: , Rfl:   •  telmisartan (MICARDIS) 20 MG tablet, Take 3 tablets by mouth Daily., Disp: , Rfl:         History of Present Illness    Patient is a 74-year-old  male who presents today for follow-up of mitral valve prolapse and hypertension.  Since last being seen patient notes overall doing well.  He denies any chest pain, pressure, tightness.  Denies any increasing shortness of breath.  No reported syncope, presyncope, or edema.  Notes that his blood pressure has been elevated lately and his Micardis dose has been uptitrated by his primary care physician.    The following portions of the patient's history were reviewed and updated as appropriate: allergies, current medications, past family history, past medical history, past social history, past surgical history and problem list.      Social History     Tobacco Use   • Smoking status: Never   • Smokeless tobacco: Never   Vaping Use   • Vaping status: Never Used   Substance Use Topics   • Alcohol use: No   • Drug use: No         ROS       Objective:   /70 (BP Location: Left arm, Patient Position: Sitting)   Pulse 73   Ht 182.9 cm (72\")   Wt 111 kg (245 lb)   SpO2 94%   BMI 33.23 kg/m²         Vitals reviewed.   Constitutional:       Appearance: Well-developed and not in distress.   Neck:      Vascular: No JVD.      Trachea: No tracheal deviation.   Pulmonary:      Effort: Pulmonary effort is normal.      Breath sounds: Normal breath sounds.   Cardiovascular:      Normal rate. Regular rhythm.      Murmurs: There is a grade 2/6 systolic murmur, radiating to the apex.   Pulses:     Intact distal pulses.   Edema:     Peripheral edema absent.   Musculoskeletal:         General: No deformity. Skin:     General: Skin is warm and dry.   Neurological:      Mental Status: Alert and oriented to person, place, and time.         Procedures  Echo:  •  Left ventricular systolic function is normal. Estimated left ventricular EF = 55%  •  Moderate " mitral valve regurgitation is present.        Assessment:   Assessment & Plan     Diagnoses and all orders for this visit:    1. Essential hypertension (Primary), elevated.  -     Basic Metabolic Panel; Future    2. Mitral valve prolapse, stable by echocardiogram performed today.  Reviewed with patient in the office today.    Other orders  -     hydroCHLOROthiazide (MICROZIDE) 12.5 MG capsule; Take 1 capsule by mouth Daily.  Dispense: 30 capsule; Refill: 11      Plan:  Add HCTZ 12.5 mg daily to his medication regimen.  Check BMP in 2 weeks.  Follow-up with primary care in 1 month for blood pressure check.  Follow-up with cardiology in 1 years time or sooner if needed.       Corinne ARMSTRONG     Advance Care Planning  ACP discussion was held with the patient during this visit. Patient does not have an advance directive, declines further assistance.        Dictated utilizing Dragon dictation

## 2025-02-10 RX ORDER — HYDROCHLOROTHIAZIDE 12.5 MG/1
12.5 CAPSULE ORAL DAILY
Qty: 30 CAPSULE | Refills: 0 | Status: SHIPPED | OUTPATIENT
Start: 2025-02-10

## 2025-04-21 ENCOUNTER — OFFICE VISIT (OUTPATIENT)
Dept: CARDIOLOGY | Facility: CLINIC | Age: 75
End: 2025-04-21
Payer: MEDICARE

## 2025-04-21 VITALS
OXYGEN SATURATION: 97 % | BODY MASS INDEX: 33.81 KG/M2 | SYSTOLIC BLOOD PRESSURE: 120 MMHG | WEIGHT: 249.6 LBS | DIASTOLIC BLOOD PRESSURE: 66 MMHG | HEIGHT: 72 IN | HEART RATE: 76 BPM

## 2025-04-21 DIAGNOSIS — I34.1 MITRAL VALVE PROLAPSE: Primary | ICD-10-CM

## 2025-04-21 DIAGNOSIS — I10 ESSENTIAL HYPERTENSION: ICD-10-CM

## 2025-04-21 PROCEDURE — 1159F MED LIST DOCD IN RCRD: CPT | Performed by: INTERNAL MEDICINE

## 2025-04-21 PROCEDURE — 99213 OFFICE O/P EST LOW 20 MIN: CPT | Performed by: INTERNAL MEDICINE

## 2025-04-21 PROCEDURE — 3078F DIAST BP <80 MM HG: CPT | Performed by: INTERNAL MEDICINE

## 2025-04-21 PROCEDURE — 1160F RVW MEDS BY RX/DR IN RCRD: CPT | Performed by: INTERNAL MEDICINE

## 2025-04-21 PROCEDURE — G2211 COMPLEX E/M VISIT ADD ON: HCPCS | Performed by: INTERNAL MEDICINE

## 2025-04-21 PROCEDURE — 3074F SYST BP LT 130 MM HG: CPT | Performed by: INTERNAL MEDICINE

## 2025-04-21 RX ORDER — COLCHICINE 0.6 MG/1
0.6 TABLET ORAL AS NEEDED
COMMUNITY
Start: 2025-04-18

## 2025-04-21 NOTE — PROGRESS NOTES
[facility]  Subjective:     Encounter Date: 04/21/2025      Patient ID: Kartik Chavez is a 75 y.o. male.    Chief Complaint: Mitral Valve Prolapse      PROBLEM LIST:  Mitral valve prolapse  Echo, January 2017, in [place]: Mild MVP, mild MR. EF 56%. Left atrium mildly dilated.  Echo, 06/17/2019: Moderate bileaflet MVP with myxomatous changes. Posterior mitral valve leaflet thickening. Moderate MR.  Echo, 07/14/2020: EF 55%. Mild AR. Moderate to severe MR due to posterior leaflet prolapse.  Echo, 04/04/2022: EF 55%. Mitral valve prolapse of posterior mitral leaflet. Moderate MR with eccentric jet. Mild aortic valve regurgitation.  Echo, 04/15/2024: EF 55% Moderate MR.   Hypertension  Arthritis  Hypothyroidism  Surgeries:  Umbilical hernia repair.  Left knee surgery         History of Present Illness  Kartik Chavez returns today for a follow up with a history of mitral valve prolapse and cardiac risk factors. Since last visit, patient has been doing well overall from a cardiovascular standpoint. He mentions shortness of breath he mostly associates with his lungs. He is still working at a golf course. Patient denies chest pain, orthopnea, palpitations, edema, dizziness, and syncope.      Allergies   Allergen Reactions    Hydromorphone Mental Status Change    Morphine Mental Status Change    Penicillins Other (See Comments)     Cold sweat         Current Outpatient Medications:     Ascorbic Acid (VITAMIN C PO), Take 1 tablet by mouth Daily., Disp: , Rfl:     aspirin 325 MG tablet, Take 1 tablet by mouth Daily., Disp: , Rfl:     Cholecalciferol (Vitamin D3) 25 MCG (1000 UT) capsule, Take 1 capsule by mouth Daily., Disp: , Rfl:     colchicine 0.6 MG tablet, Take 1 tablet by mouth As Needed., Disp: , Rfl:     hydroCHLOROthiazide (MICROZIDE) 12.5 MG capsule, TAKE 1 CAPSULE BY MOUTH ONCE DAILY, Disp: 30 capsule, Rfl: 0    levothyroxine (SYNTHROID, LEVOTHROID) 100 MCG tablet, Take 1  "tablet by mouth Daily., Disp: , Rfl:     metoprolol succinate XL (TOPROL-XL) 50 MG 24 hr tablet, Take 1 tablet by mouth Daily., Disp: , Rfl:     telmisartan (MICARDIS) 20 MG tablet, Take 3 tablets by mouth Daily., Disp: , Rfl:     The following portions of the patient's history were reviewed and updated as appropriate: allergies, current medications, past family history, past medical history, past social history, past surgical history and problem list.    ROS       Objective:     Vitals:    04/21/25 1417   BP: 120/66   BP Location: Right arm   Patient Position: Sitting   Cuff Size: Adult   Pulse: 76   SpO2: 97%   Weight: 113 kg (249 lb 9.6 oz)   Height: 182.9 cm (72\")         Vitals reviewed.   Constitutional:       Appearance: Well-developed and not in distress.   Neck:      Thyroid: No thyromegaly.      Vascular: No carotid bruit or JVD.   Pulmonary:      Breath sounds: Normal breath sounds.   Cardiovascular:      Regular rhythm.      No gallop. No S3 and S4 gallop.   Pulses:     Intact distal pulses.      Carotid: 2+ bilaterally.     Radial: 2+ bilaterally.  Edema:     Peripheral edema absent.   Abdominal:      General: Bowel sounds are normal.      Palpations: Abdomen is soft. There is no abdominal mass.      Tenderness: There is no abdominal tenderness.   Musculoskeletal:         General: No deformity.      Extremities: No clubbing present.Skin:     General: Skin is warm and dry.      Findings: No rash.   Neurological:      Mental Status: Alert and oriented to person, place, and time.         Lab Review:  Lab date: 08/19/2024  FLP:   CMP: Glu 82, BUN 20, Creat 1.4, eGFR 56, Na 142, K 5.3, Cl 108, CO2 25, Ca 9.8, Alk Phos 28, AST 33, ALT 36  CBC: WBC 5.16, RBC 5.04, HGB 15.3, HCT 50.4, MCV 99.9, MCH 30.4,     Procedures      Advance Care Planning   ACP discussion was held with the patient during this visit. Patient does not have an advance directive, declines further assistance.         "   Assessment:   Diagnoses and all orders for this visit:    1. Mitral valve prolapse (Primary)    2. Essential hypertension        Impression:  1. Mitral valve prolapse. Echo ordered.     2. Essential hypertension. Controlled. Continue on hydrochlorothiazide 12.5 mg daily for hypertension. Continue on metoprolol 50 mg daily for rate control and hypertension.     Plan:  Stable cardiac status.   Echo ordered to follow up on mitral valve prolapse.   Continue current medications.  Revisit in 12 MO, or sooner as needed.        Scribed for Anthony Garcia MD by Urszula Amaral. 4/21/2025  15:47 EDT  Anthony Garcia MD      Please note that portions of this note may have been completed with a voice recognition program. Efforts were made to edit the dictations, but occasionally words are mistranscribed.

## 2025-06-25 RX ORDER — HYDROCHLOROTHIAZIDE 12.5 MG/1
12.5 CAPSULE ORAL DAILY
Qty: 30 CAPSULE | Refills: 11 | Status: SHIPPED | OUTPATIENT
Start: 2025-06-25

## 2025-07-02 ENCOUNTER — HOSPITAL ENCOUNTER (OUTPATIENT)
Dept: CARDIOLOGY | Facility: HOSPITAL | Age: 75
Discharge: HOME | End: 2025-07-02
Admitting: INTERNAL MEDICINE
Payer: MEDICARE

## 2025-07-02 VITALS — BODY MASS INDEX: 33.74 KG/M2 | HEIGHT: 72 IN | WEIGHT: 249.12 LBS

## 2025-07-02 DIAGNOSIS — I34.1 MITRAL VALVE PROLAPSE: ICD-10-CM

## 2025-07-02 PROCEDURE — 93306 TTE W/DOPPLER COMPLETE: CPT

## 2025-07-03 ENCOUNTER — RESULTS FOLLOW-UP (OUTPATIENT)
Dept: CARDIOLOGY | Facility: CLINIC | Age: 75
End: 2025-07-03
Payer: MEDICARE

## 2025-07-03 ENCOUNTER — TELEPHONE (OUTPATIENT)
Dept: CARDIOLOGY | Facility: CLINIC | Age: 75
End: 2025-07-03

## 2025-07-03 LAB
AORTIC DIMENSIONLESS INDEX: 0.63 (DI)
AV MEAN PRESS GRAD SYS DOP V1V2: 4 MMHG
AV VMAX SYS DOP: 127 CM/SEC
BH CV ECHO MEAS - AI P1/2T: 338.3 MSEC
BH CV ECHO MEAS - AO MAX PG: 6.5 MMHG
BH CV ECHO MEAS - AO ROOT DIAM: 3.9 CM
BH CV ECHO MEAS - AO V2 VTI: 29.7 CM
BH CV ECHO MEAS - AVA(I,D): 1.99 CM2
BH CV ECHO MEAS - EDV(CUBED): 238.3 ML
BH CV ECHO MEAS - EDV(MOD-SP2): 154 ML
BH CV ECHO MEAS - EDV(MOD-SP4): 169 ML
BH CV ECHO MEAS - EF(MOD-SP2): 57.6 %
BH CV ECHO MEAS - EF(MOD-SP4): 52.9 %
BH CV ECHO MEAS - ESV(CUBED): 64 ML
BH CV ECHO MEAS - ESV(MOD-SP2): 65.3 ML
BH CV ECHO MEAS - ESV(MOD-SP4): 79.6 ML
BH CV ECHO MEAS - FS: 35.5 %
BH CV ECHO MEAS - IVS/LVPW: 1 CM
BH CV ECHO MEAS - IVSD: 1.2 CM
BH CV ECHO MEAS - LA DIMENSION: 5.5 CM
BH CV ECHO MEAS - LAT PEAK E' VEL: 9.9 CM/SEC
BH CV ECHO MEAS - LV DIASTOLIC VOL/BSA (35-75): 72.3 CM2
BH CV ECHO MEAS - LV MASS(C)D: 331.5 GRAMS
BH CV ECHO MEAS - LV MAX PG: 2.8 MMHG
BH CV ECHO MEAS - LV MEAN PG: 1.5 MMHG
BH CV ECHO MEAS - LV SYSTOLIC VOL/BSA (12-30): 34 CM2
BH CV ECHO MEAS - LV V1 MAX: 83.7 CM/SEC
BH CV ECHO MEAS - LV V1 VTI: 18.8 CM
BH CV ECHO MEAS - LVIDD: 6.2 CM
BH CV ECHO MEAS - LVIDS: 4 CM
BH CV ECHO MEAS - LVOT AREA: 3.1 CM2
BH CV ECHO MEAS - LVOT DIAM: 2 CM
BH CV ECHO MEAS - LVPWD: 1.2 CM
BH CV ECHO MEAS - MED PEAK E' VEL: 5.2 CM/SEC
BH CV ECHO MEAS - MV A MAX VEL: 79.4 CM/SEC
BH CV ECHO MEAS - MV DEC SLOPE: 230 CM/SEC2
BH CV ECHO MEAS - MV DEC TIME: 0.23 SEC
BH CV ECHO MEAS - MV E MAX VEL: 62.9 CM/SEC
BH CV ECHO MEAS - MV E/A: 0.79
BH CV ECHO MEAS - MV MAX PG: 3.4 MMHG
BH CV ECHO MEAS - MV MEAN PG: 2 MMHG
BH CV ECHO MEAS - MV P1/2T: 100.3 MSEC
BH CV ECHO MEAS - MV V2 VTI: 30.9 CM
BH CV ECHO MEAS - MVA(P1/2T): 2.19 CM2
BH CV ECHO MEAS - MVA(VTI): 1.91 CM2
BH CV ECHO MEAS - PA ACC TIME: 0.14 SEC
BH CV ECHO MEAS - SV(LVOT): 59.1 ML
BH CV ECHO MEAS - SV(MOD-SP2): 88.7 ML
BH CV ECHO MEAS - SV(MOD-SP4): 89.4 ML
BH CV ECHO MEAS - SVI(LVOT): 25.3 ML/M2
BH CV ECHO MEAS - SVI(MOD-SP2): 37.9 ML/M2
BH CV ECHO MEAS - SVI(MOD-SP4): 38.2 ML/M2
BH CV ECHO MEAS - TAPSE (>1.6): 2.7 CM
BH CV ECHO MEASUREMENTS AVERAGE E/E' RATIO: 8.33
BH CV VAS BP RIGHT ARM: NORMAL MMHG
BH CV XLRA - RV BASE: 3.8 CM
BH CV XLRA - RV LENGTH: 9 CM
BH CV XLRA - RV MID: 3.1 CM
BH CV XLRA - TDI S': 12.1 CM/SEC
IVRT: 137 MS
LEFT ATRIUM VOLUME INDEX: 27 ML/M2
LV EF 2D ECHO EST: 55 %
LV EF BIPLANE MOD: 55.2 %

## 2025-07-03 NOTE — TELEPHONE ENCOUNTER
Caller: Gabriela Pettit    Relationship: Emergency Contact    Best call back number: Mobile phone: [phone]     Caller requesting test results: [employer]ATEIN[employer] WIFE GABRIELA PETTIT    What test was performed: ECHO    When was the test performed: 7/2/23    Where was the test performed: [facility]    Additional notes: PLEASE CALL BACK TO ADVISE, THANK YOU.